# Patient Record
Sex: FEMALE | Race: ASIAN
[De-identification: names, ages, dates, MRNs, and addresses within clinical notes are randomized per-mention and may not be internally consistent; named-entity substitution may affect disease eponyms.]

---

## 2021-01-19 ENCOUNTER — HOSPITAL ENCOUNTER (OUTPATIENT)
Dept: HOSPITAL 8 - CFH | Age: 61
Discharge: HOME | End: 2021-01-19
Attending: INTERNAL MEDICINE
Payer: COMMERCIAL

## 2021-01-19 DIAGNOSIS — R14.0: ICD-10-CM

## 2021-01-19 DIAGNOSIS — R10.32: Primary | ICD-10-CM

## 2021-01-19 DIAGNOSIS — R11.0: ICD-10-CM

## 2021-01-19 DIAGNOSIS — Z87.442: ICD-10-CM

## 2021-01-19 PROCEDURE — 74177 CT ABD & PELVIS W/CONTRAST: CPT

## 2021-01-19 PROCEDURE — 82565 ASSAY OF CREATININE: CPT

## 2021-04-29 ENCOUNTER — HOSPITAL ENCOUNTER (EMERGENCY)
Dept: HOSPITAL 8 - ED | Age: 61
Discharge: HOME | End: 2021-04-29
Payer: COMMERCIAL

## 2021-04-29 VITALS — BODY MASS INDEX: 26.16 KG/M2 | HEIGHT: 64 IN | WEIGHT: 153.22 LBS

## 2021-04-29 VITALS — SYSTOLIC BLOOD PRESSURE: 126 MMHG | DIASTOLIC BLOOD PRESSURE: 76 MMHG

## 2021-04-29 DIAGNOSIS — R06.02: ICD-10-CM

## 2021-04-29 DIAGNOSIS — K92.1: Primary | ICD-10-CM

## 2021-04-29 DIAGNOSIS — K92.0: ICD-10-CM

## 2021-04-29 DIAGNOSIS — R10.13: ICD-10-CM

## 2021-04-29 DIAGNOSIS — G89.29: ICD-10-CM

## 2021-04-29 DIAGNOSIS — Z79.899: ICD-10-CM

## 2021-04-29 DIAGNOSIS — D64.9: ICD-10-CM

## 2021-04-29 DIAGNOSIS — R94.31: ICD-10-CM

## 2021-04-29 DIAGNOSIS — R11.2: ICD-10-CM

## 2021-04-29 LAB
ALBUMIN SERPL-MCNC: 3.5 G/DL (ref 3.4–5)
ALP SERPL-CCNC: 71 U/L (ref 45–117)
ALT SERPL-CCNC: 23 U/L (ref 12–78)
ANION GAP SERPL CALC-SCNC: 5 MMOL/L (ref 5–15)
BASOPHILS # BLD AUTO: 0.1 X10^3/UL (ref 0–0.1)
BASOPHILS NFR BLD AUTO: 1 % (ref 0–1)
BILIRUB SERPL-MCNC: 0.2 MG/DL (ref 0.2–1)
CALCIUM SERPL-MCNC: 8.5 MG/DL (ref 8.5–10.1)
CHLORIDE SERPL-SCNC: 107 MMOL/L (ref 98–107)
CREAT SERPL-MCNC: 0.75 MG/DL (ref 0.55–1.02)
EOSINOPHIL # BLD AUTO: 0.3 X10^3/UL (ref 0–0.4)
EOSINOPHIL NFR BLD AUTO: 2 % (ref 1–7)
ERYTHROCYTE [DISTWIDTH] IN BLOOD BY AUTOMATED COUNT: 12.8 % (ref 9.6–15.2)
INR PPP: 0.91 (ref 0.93–1.1)
LYMPHOCYTES # BLD AUTO: 4.4 X10^3/UL (ref 1–3.4)
LYMPHOCYTES NFR BLD AUTO: 38 % (ref 22–44)
MCH RBC QN AUTO: 29.6 PG (ref 27–34.8)
MCHC RBC AUTO-ENTMCNC: 34.3 G/DL (ref 32.4–35.8)
MD: NO
MONOCYTES # BLD AUTO: 0.6 X10^3/UL (ref 0.2–0.8)
MONOCYTES NFR BLD AUTO: 5 % (ref 2–9)
NEUTROPHILS # BLD AUTO: 6.2 X10^3/UL (ref 1.8–6.8)
NEUTROPHILS NFR BLD AUTO: 54 % (ref 42–75)
PLATELET # BLD AUTO: 322 X10^3/UL (ref 130–400)
PMV BLD AUTO: 7.6 FL (ref 7.4–10.4)
PROT SERPL-MCNC: 7.5 G/DL (ref 6.4–8.2)
PROTHROMBIN TIME: 9.8 SECONDS (ref 9.6–11.5)
RBC # BLD AUTO: 3.14 X10^6/UL (ref 3.82–5.3)
TROPONIN I SERPL-MCNC: < 0.015 NG/ML (ref 0–0.04)

## 2021-04-29 PROCEDURE — 85610 PROTHROMBIN TIME: CPT

## 2021-04-29 PROCEDURE — 43247 EGD REMOVE FOREIGN BODY: CPT

## 2021-04-29 PROCEDURE — 80053 COMPREHEN METABOLIC PANEL: CPT

## 2021-04-29 PROCEDURE — 71045 X-RAY EXAM CHEST 1 VIEW: CPT

## 2021-04-29 PROCEDURE — 99152 MOD SED SAME PHYS/QHP 5/>YRS: CPT

## 2021-04-29 PROCEDURE — 96366 THER/PROPH/DIAG IV INF ADDON: CPT

## 2021-04-29 PROCEDURE — 93005 ELECTROCARDIOGRAM TRACING: CPT

## 2021-04-29 PROCEDURE — 84484 ASSAY OF TROPONIN QUANT: CPT

## 2021-04-29 PROCEDURE — 36415 COLL VENOUS BLD VENIPUNCTURE: CPT

## 2021-04-29 PROCEDURE — 83690 ASSAY OF LIPASE: CPT

## 2021-04-29 PROCEDURE — 83605 ASSAY OF LACTIC ACID: CPT

## 2021-04-29 PROCEDURE — 74177 CT ABD & PELVIS W/CONTRAST: CPT

## 2021-04-29 PROCEDURE — 99285 EMERGENCY DEPT VISIT HI MDM: CPT

## 2021-04-29 PROCEDURE — C9113 INJ PANTOPRAZOLE SODIUM, VIA: HCPCS

## 2021-04-29 PROCEDURE — 85025 COMPLETE CBC W/AUTO DIFF WBC: CPT

## 2021-04-29 PROCEDURE — 96365 THER/PROPH/DIAG IV INF INIT: CPT

## 2021-04-29 NOTE — NUR
[place]  AT [place]. NADN/VSS. CALL LIGHT 
WITHIN REACH. PT STATES SHE FEELS LESS ANXIOUS. NO NEEDS AT THIS TIME.

## 2021-04-29 NOTE — NUR
PT WAS ADVISED BY HER GI DOCTOR TO COME IN D/T BLOODY STOOL. SHE HAD A SCOPE 
COMPLETED LAST TUESDAY.

## 2021-04-29 NOTE — NUR
THIS RN & PRIMARY RN (MYLA) ARRIVED TO ROOM AS PT RETURNED FROM CT. PT PLACED 
BACK ON MONITORS BY CT TECH WHO REPORTED PT C/O CHEST PRESSURE EN ROUTE BACK TO 
ROOM. WHEN ASKED HOW SHE WAS FEELING UPON RETURN, PT STATED "I FEEL BETTER, MY 
CHEST DOESN'T BOTHER ME ANYMORE", PT ANXIOUS BUT REDIRECTABLE, VSS WITH HR 
.

## 2021-04-29 NOTE — NUR
PT C/O FEELING ANXIOUS STATING SHE IS HAVING TROUBLE BREATHING, PLACED ON 1L 
N.C. FOR COMFORT. PT STATES SHE DOES NOT WANT MEDICATION. NO CHANGES IN CARDIAC 
RHYTHM OR OTHER VITALS SINCE ARRIVAL TO ED. VERBAL REASSURANCE GIVEN WITH 
LITTLE HELP D/T HUSBANDS FRUSTRATION WITH SITUATION

-------------------------------------------------------------------------------

Addendum: 04/29/21 at 1609 by [email or web address]

-------------------------------------------------------------------------------

PT C/O FEELING ANXIOUS STATING SHE IS HAVING TROUBLE BREATHING, PLACED ON 1L 
N.C. FOR COMFORT. PT STATES SHE DOES NOT WANT MEDICATION. NO CHANGES IN CARDIAC 
RHYTHM OR OTHER VITALS SINCE ARRIVAL TO ED. VERBAL REASSURANCE GIVEN WITH 
LITTLE HELP D/T HUSBANDS FRUSTRATION WITH SITUATION. ERP AWARE

## 2021-04-29 NOTE — NUR
Patient &  given discharge instructions and they have confirmed that 
they understand the instructions.  Patient ambulatory with steady gait but 
taken to DC desk via WC per pt request.